# Patient Record
Sex: FEMALE
[De-identification: names, ages, dates, MRNs, and addresses within clinical notes are randomized per-mention and may not be internally consistent; named-entity substitution may affect disease eponyms.]

---

## 2021-10-23 ENCOUNTER — NURSE TRIAGE (OUTPATIENT)
Dept: OTHER | Facility: CLINIC | Age: 26
End: 2021-10-23

## 2021-10-23 NOTE — TELEPHONE ENCOUNTER
Brief description of triage: Chaya Anders states that she woke this morning with ear pain on the left. She is also positive for Covid. Please see triage below for more detailed information. Triage indicates for home care    Care advice provided, patient verbalizes understanding; denies any other questions or concerns; instructed to call back for any new or worsening symptoms. This triage is a result of a call to [address] [place] [address]. Please do not respond to the triage nurse through this encounter. Any subsequent communication should be directly with the patient. Reason for Disposition   Ear pain is main symptom   Earache  (Exceptions: brief ear pain of < 60 minutes duration, earache occurring during air travel    Answer Assessment - Initial Assessment Questions  1. COVID-19 DIAGNOSIS: \"Who made your Coronavirus (COVID-19) diagnosis? \" \"Was it confirmed by a positive lab test?\" If not diagnosed by a HCP, ask \"Are there lots of cases (community spread) where you live? \" (See [facility] health department website, if unsure)      Confirmed by a lab on Wednesday    2. COVID-19 EXPOSURE: \"Was there any known exposure to COVID before the symptoms began? \" CDC Definition of close contact: within 6 feet (2 meters) for a total of 15 minutes or more over a 24-hour period. Yes    3. ONSET: \"When did the COVID-19 symptoms start? \"       10/18/21 with sore throat    4. WORST SYMPTOM: \"What is your worst symptom? \" (e.g., cough, fever, shortness of breath, muscle aches)      Sore throat, nasal congestion    5. COUGH: \"Do you have a cough? \" If so, ask: \"How bad is the cough? \"        Slight cough but getting better    6. FEVER: \"Do you have a fever? \" If so, ask: \"What is your temperature, how was it measured, and when did it start? \"      100.5 and on day 3    7. RESPIRATORY STATUS: \"Describe your breathing? \" (e.g., shortness of breath, wheezing, unable to speak)       Denies all breathing problems    8. BETTER-SAME-WORSE: Brody Bowl you getting better, staying the same or getting worse compared to yesterday? \"  If getting worse, ask, \"In what way? \"      Cough is better but started with new ear pain. States that she overall feels better    9. HIGH RISK DISEASE: \"Do you have any chronic medical problems? \" (e.g., asthma, heart or lung disease, weak immune system, obesity, etc.)      Denies all    10. PREGNANCY: \"Is there any chance you are pregnant? \" \"When was your last menstrual period? \"        No    11. OTHER SYMPTOMS: \"Do you have any other symptoms? \"  (e.g., chills, fatigue, headache, loss of smell or taste, muscle pain, sore throat; new loss of smell or taste especially support the diagnosis of COVID-19)        Ear pain, 5/10, pressure and sharp when opening mouth    Answer Assessment - Initial Assessment Questions  1. LOCATION: \"Which ear is involved? \"        Left ear    2. SENSATION: \"Describe how the ear feels. \"       Congested with pain    3. ONSET:  \"When did the ear symptoms start? \"        Last night    4. PAIN: \"Do you also have an earache? \" If so, ask: \"How bad is it? \" (Scale 1-10; or mild, moderate, severe)      5/10    5. CAUSE: \"What do you think is causing the ear congestion? \"      Covid virus    6. URI: \"Do you have a runny nose or cough? \"       Congestion and cough    7. NASAL ALLERGIES: \"Are there symptoms of hay fever, such as sneezing or a clear nasal discharge? \"      NA    8. PREGNANCY: \"Is there any chance you are pregnant? \" \"When was your last menstrual period? \"      No    Protocols used: EAR - CONGESTION-ADULT-AH, EARACHE-ADULT-AH, CORONAVIRUS (COVID-19) DIAGNOSED OR SUSPECTED-ADULT-AH

## 2023-03-08 ENCOUNTER — TELEMEDICINE (OUTPATIENT)
Dept: PRIMARY CARE | Facility: CLINIC | Age: 28
End: 2023-03-08
Payer: COMMERCIAL

## 2023-03-08 PROBLEM — D68.51 HETEROZYGOUS FACTOR V LEIDEN MUTATION (MULTI): Status: ACTIVE | Noted: 2023-03-08

## 2023-03-08 PROBLEM — R50.9 FEVER AND CHILLS: Status: ACTIVE | Noted: 2023-03-08

## 2023-03-08 PROBLEM — Z20.822 EXPOSURE TO COVID-19 VIRUS: Status: ACTIVE | Noted: 2023-03-08

## 2023-03-08 PROBLEM — B27.00 GAMMAHERPESVIRAL MONONUCLEOSIS WITHOUT COMPLICATION: Status: ACTIVE | Noted: 2023-03-08

## 2023-03-08 PROBLEM — J02.9 SORE THROAT: Status: ACTIVE | Noted: 2023-03-08

## 2023-03-08 PROBLEM — U07.1 COVID-19: Status: ACTIVE | Noted: 2023-03-08

## 2023-03-08 PROBLEM — R09.89 RUNNY NOSE: Status: ACTIVE | Noted: 2023-03-08

## 2023-03-08 PROBLEM — R09.81 NASAL CONGESTION: Status: ACTIVE | Noted: 2023-03-08

## 2023-03-08 PROBLEM — G43.019 COMMON MIGRAINE WITH INTRACTABLE MIGRAINE: Status: ACTIVE | Noted: 2023-03-08

## 2023-03-08 PROBLEM — L30.9 DERMATITIS: Status: ACTIVE | Noted: 2023-03-08

## 2023-03-08 PROBLEM — H10.9 BACTERIAL CONJUNCTIVITIS OF LEFT EYE: Status: ACTIVE | Noted: 2023-03-08

## 2023-03-08 PROBLEM — R52 BODY ACHES: Status: ACTIVE | Noted: 2023-03-08

## 2023-03-08 PROBLEM — R05.9 COUGH: Status: ACTIVE | Noted: 2023-03-08

## 2023-03-08 RX ORDER — BUDESONIDE AND FORMOTEROL FUMARATE DIHYDRATE 160; 4.5 UG/1; UG/1
2 AEROSOL RESPIRATORY (INHALATION) 2 TIMES DAILY
COMMUNITY
Start: 2021-10-25

## 2023-03-08 RX ORDER — NORETHINDRONE 0.35 MG/1
TABLET ORAL
COMMUNITY
Start: 2018-11-06

## 2023-03-08 RX ORDER — TOBRAMYCIN 3 MG/ML
1 SOLUTION/ DROPS OPHTHALMIC 4 TIMES DAILY
COMMUNITY
Start: 2021-10-25 | End: 2023-08-10 | Stop reason: ALTCHOICE

## 2023-03-09 ENCOUNTER — TELEMEDICINE (OUTPATIENT)
Dept: PRIMARY CARE | Facility: CLINIC | Age: 28
End: 2023-03-09
Payer: COMMERCIAL

## 2023-03-09 DIAGNOSIS — G43.509 PERSISTENT MIGRAINE AURA WITHOUT CEREBRAL INFARCTION AND WITHOUT STATUS MIGRAINOSUS, NOT INTRACTABLE: Primary | ICD-10-CM

## 2023-03-09 PROCEDURE — 99213 OFFICE O/P EST LOW 20 MIN: CPT | Performed by: FAMILY MEDICINE

## 2023-03-09 RX ORDER — ZOLMITRIPTAN 2.5 MG/1
TABLET, ORALLY DISINTEGRATING ORAL
Qty: 6 TABLET | Refills: 5 | Status: SHIPPED | OUTPATIENT
Start: 2023-03-09 | End: 2023-08-10

## 2023-03-09 NOTE — PROGRESS NOTES
Subjective   Patient ID: Camila Altamirano is a 28 y.o. female who presents virtually for concerns of migraines     HPI     Camila presents virtually for concerns of migraines that are increasing in frequency that she would like to start an abortive medication if possible and is interesting in learning more about Botox for migraines, apparently some friends have used this with success  She has 2 in one month, and apparently frequency has increased over the last several months.  Headache symptoms seem to last about 5 hours and she has to go into a dark room until it passes.  Sometimes she will use OTC medications without much benefit.  She sees stars that she recognizes as her aura.  In the past, her arm used to go numb when she was a kid before she had a migraine had her, now it seems that this has been recurring.    Review of Systems   All other systems reviewed and are negative.    Objective   There were no vitals taken for this visit.    Physical Exam  CONSTITUTIONAL - well nourished, well developed, looks like stated age, in no acute distress, not ill-appearing, and not tired appearing  SKIN - normal skin color and pigmentation, normal skin turgor without rash, lesions, or nodules visualized  HEAD - no trauma, normocephalic  EYES - pupils are equal and reactive to light, extraocular muscles are intact, and normal external exam  ENT - moist mucus membranes   PSYCHIATRIC - alert, pleasant and cordial, age-appropriate      Assessment/Plan   Diagnoses and all orders for this visit:  Persistent migraine aura without cerebral infarction and without status migrainosus, not intractable  -     ZOLMitriptan (Zomig-ZMT) 2.5 mg disintegrating tablet; Take 1 tablet at the onset of headache, may repeat in 2 hours if unresolved. Do not exceed 10 mg in 24 hours.  -Contact office in a month regarding efficacy and tolerability after which we can make adjustments in medication, which may be to consider one of the newer migraine  agents  -Botox injections may have to come down the road, but I suspect that if we can control these migraine headaches with the use of any of the available oral medications, Botox should be an after thought  -If headache symptoms continue to persist, intracranial imaging may be warranted  -Risks, benefits, and options of treatment(s) were discussed after reviewing all current medication(s) and drug allergy(ies)    I evaluated the patient, participating in the key portions of the service.  I reviewed the resident’s note.  I agree with the resident’s findings and plan.     Freddy Neumann, DO

## 2023-03-15 ENCOUNTER — APPOINTMENT (OUTPATIENT)
Dept: PRIMARY CARE | Facility: CLINIC | Age: 28
End: 2023-03-15
Payer: COMMERCIAL

## 2023-04-13 ENCOUNTER — LAB (OUTPATIENT)
Dept: LAB | Facility: LAB | Age: 28
End: 2023-04-13
Payer: COMMERCIAL

## 2023-04-13 ENCOUNTER — OFFICE VISIT (OUTPATIENT)
Dept: PRIMARY CARE | Facility: CLINIC | Age: 28
End: 2023-04-13
Payer: COMMERCIAL

## 2023-04-13 VITALS
HEIGHT: 63 IN | DIASTOLIC BLOOD PRESSURE: 70 MMHG | HEART RATE: 71 BPM | WEIGHT: 139.2 LBS | SYSTOLIC BLOOD PRESSURE: 106 MMHG | BODY MASS INDEX: 24.66 KG/M2

## 2023-04-13 DIAGNOSIS — Z23 NEED FOR TDAP VACCINATION: ICD-10-CM

## 2023-04-13 DIAGNOSIS — Z00.00 ANNUAL PHYSICAL EXAM: ICD-10-CM

## 2023-04-13 DIAGNOSIS — G43.019 COMMON MIGRAINE WITH INTRACTABLE MIGRAINE: ICD-10-CM

## 2023-04-13 DIAGNOSIS — D22.9 BENIGN NEVUS: ICD-10-CM

## 2023-04-13 DIAGNOSIS — Z00.00 ANNUAL PHYSICAL EXAM: Primary | ICD-10-CM

## 2023-04-13 PROBLEM — H10.9 BACTERIAL CONJUNCTIVITIS OF LEFT EYE: Status: RESOLVED | Noted: 2023-03-08 | Resolved: 2023-04-13

## 2023-04-13 PROBLEM — L30.9 DERMATITIS: Status: RESOLVED | Noted: 2023-03-08 | Resolved: 2023-04-13

## 2023-04-13 PROBLEM — R09.89 RUNNY NOSE: Status: RESOLVED | Noted: 2023-03-08 | Resolved: 2023-04-13

## 2023-04-13 PROBLEM — J02.9 SORE THROAT: Status: RESOLVED | Noted: 2023-03-08 | Resolved: 2023-04-13

## 2023-04-13 PROBLEM — U07.1 COVID-19: Status: RESOLVED | Noted: 2023-03-08 | Resolved: 2023-04-13

## 2023-04-13 PROBLEM — R52 BODY ACHES: Status: RESOLVED | Noted: 2023-03-08 | Resolved: 2023-04-13

## 2023-04-13 PROBLEM — R05.9 COUGH: Status: RESOLVED | Noted: 2023-03-08 | Resolved: 2023-04-13

## 2023-04-13 PROBLEM — R50.9 FEVER AND CHILLS: Status: RESOLVED | Noted: 2023-03-08 | Resolved: 2023-04-13

## 2023-04-13 PROBLEM — R09.81 NASAL CONGESTION: Status: RESOLVED | Noted: 2023-03-08 | Resolved: 2023-04-13

## 2023-04-13 PROBLEM — Z20.822 EXPOSURE TO COVID-19 VIRUS: Status: RESOLVED | Noted: 2023-03-08 | Resolved: 2023-04-13

## 2023-04-13 LAB
ALANINE AMINOTRANSFERASE (SGPT) (U/L) IN SER/PLAS: 8 U/L (ref 7–45)
ALBUMIN (G/DL) IN SER/PLAS: 4.7 G/DL (ref 3.4–5)
ALKALINE PHOSPHATASE (U/L) IN SER/PLAS: 42 U/L (ref 33–110)
ANION GAP IN SER/PLAS: 13 MMOL/L (ref 10–20)
ASPARTATE AMINOTRANSFERASE (SGOT) (U/L) IN SER/PLAS: 20 U/L (ref 9–39)
BASOPHILS (10*3/UL) IN BLOOD BY AUTOMATED COUNT: 0.07 X10E9/L (ref 0–0.1)
BASOPHILS/100 LEUKOCYTES IN BLOOD BY AUTOMATED COUNT: 1 % (ref 0–2)
BILIRUBIN TOTAL (MG/DL) IN SER/PLAS: 1.2 MG/DL (ref 0–1.2)
CALCIUM (MG/DL) IN SER/PLAS: 9.3 MG/DL (ref 8.6–10.3)
CARBON DIOXIDE, TOTAL (MMOL/L) IN SER/PLAS: 24 MMOL/L (ref 21–32)
CHLORIDE (MMOL/L) IN SER/PLAS: 104 MMOL/L (ref 98–107)
CHOLESTEROL (MG/DL) IN SER/PLAS: 162 MG/DL (ref 0–199)
CHOLESTEROL IN HDL (MG/DL) IN SER/PLAS: 81.8 MG/DL
CHOLESTEROL/HDL RATIO: 2
CREATININE (MG/DL) IN SER/PLAS: 0.85 MG/DL (ref 0.5–1.05)
EOSINOPHILS (10*3/UL) IN BLOOD BY AUTOMATED COUNT: 0.07 X10E9/L (ref 0–0.7)
EOSINOPHILS/100 LEUKOCYTES IN BLOOD BY AUTOMATED COUNT: 1 % (ref 0–6)
ERYTHROCYTE DISTRIBUTION WIDTH (RATIO) BY AUTOMATED COUNT: 12.2 % (ref 11.5–14.5)
ERYTHROCYTE MEAN CORPUSCULAR HEMOGLOBIN CONCENTRATION (G/DL) BY AUTOMATED: 33.6 G/DL (ref 32–36)
ERYTHROCYTE MEAN CORPUSCULAR VOLUME (FL) BY AUTOMATED COUNT: 95 FL (ref 80–100)
ERYTHROCYTES (10*6/UL) IN BLOOD BY AUTOMATED COUNT: 4.63 X10E12/L (ref 4–5.2)
GFR FEMALE: >90 ML/MIN/1.73M2
GLUCOSE (MG/DL) IN SER/PLAS: 83 MG/DL (ref 74–99)
HEMATOCRIT (%) IN BLOOD BY AUTOMATED COUNT: 44 % (ref 36–46)
HEMOGLOBIN (G/DL) IN BLOOD: 14.8 G/DL (ref 12–16)
IMMATURE GRANULOCYTES/100 LEUKOCYTES IN BLOOD BY AUTOMATED COUNT: 0.3 % (ref 0–0.9)
LDL: 69 MG/DL (ref 0–99)
LEUKOCYTES (10*3/UL) IN BLOOD BY AUTOMATED COUNT: 7.3 X10E9/L (ref 4.4–11.3)
LYMPHOCYTES (10*3/UL) IN BLOOD BY AUTOMATED COUNT: 3.3 X10E9/L (ref 1.2–4.8)
LYMPHOCYTES/100 LEUKOCYTES IN BLOOD BY AUTOMATED COUNT: 45.4 % (ref 13–44)
MONOCYTES (10*3/UL) IN BLOOD BY AUTOMATED COUNT: 0.66 X10E9/L (ref 0.1–1)
MONOCYTES/100 LEUKOCYTES IN BLOOD BY AUTOMATED COUNT: 9.1 % (ref 2–10)
NEUTROPHILS (10*3/UL) IN BLOOD BY AUTOMATED COUNT: 3.15 X10E9/L (ref 1.2–7.7)
NEUTROPHILS/100 LEUKOCYTES IN BLOOD BY AUTOMATED COUNT: 43.2 % (ref 40–80)
PLATELETS (10*3/UL) IN BLOOD AUTOMATED COUNT: 265 X10E9/L (ref 150–450)
POC APPEARANCE, URINE: CLEAR
POC BILIRUBIN, URINE: NEGATIVE
POC BLOOD, URINE: NEGATIVE
POC COLOR, URINE: YELLOW
POC GLUCOSE, URINE: NEGATIVE MG/DL
POC KETONES, URINE: NEGATIVE MG/DL
POC LEUKOCYTES, URINE: NEGATIVE
POC NITRITE,URINE: NEGATIVE
POC PH, URINE: 6 PH
POC PROTEIN, URINE: NEGATIVE MG/DL
POC SPECIFIC GRAVITY, URINE: 1.01
POC UROBILINOGEN, URINE: 0.2 EU/DL
POTASSIUM (MMOL/L) IN SER/PLAS: 4.8 MMOL/L (ref 3.5–5.3)
PROTEIN TOTAL: 7.7 G/DL (ref 6.4–8.2)
SODIUM (MMOL/L) IN SER/PLAS: 136 MMOL/L (ref 136–145)
THYROTROPIN (MIU/L) IN SER/PLAS BY DETECTION LIMIT <= 0.05 MIU/L: 3.14 MIU/L (ref 0.44–3.98)
TRIGLYCERIDE (MG/DL) IN SER/PLAS: 57 MG/DL (ref 0–149)
UREA NITROGEN (MG/DL) IN SER/PLAS: 22 MG/DL (ref 6–23)
VLDL: 11 MG/DL (ref 0–40)

## 2023-04-13 PROCEDURE — 1036F TOBACCO NON-USER: CPT | Performed by: FAMILY MEDICINE

## 2023-04-13 PROCEDURE — 93000 ELECTROCARDIOGRAM COMPLETE: CPT | Performed by: FAMILY MEDICINE

## 2023-04-13 PROCEDURE — 99395 PREV VISIT EST AGE 18-39: CPT | Performed by: FAMILY MEDICINE

## 2023-04-13 PROCEDURE — 84443 ASSAY THYROID STIM HORMONE: CPT

## 2023-04-13 PROCEDURE — 90471 IMMUNIZATION ADMIN: CPT | Performed by: FAMILY MEDICINE

## 2023-04-13 PROCEDURE — 80061 LIPID PANEL: CPT

## 2023-04-13 PROCEDURE — 90715 TDAP VACCINE 7 YRS/> IM: CPT | Performed by: FAMILY MEDICINE

## 2023-04-13 PROCEDURE — 80053 COMPREHEN METABOLIC PANEL: CPT

## 2023-04-13 PROCEDURE — 85025 COMPLETE CBC W/AUTO DIFF WBC: CPT

## 2023-04-13 PROCEDURE — 36415 COLL VENOUS BLD VENIPUNCTURE: CPT

## 2023-04-13 PROCEDURE — 81002 URINALYSIS NONAUTO W/O SCOPE: CPT | Performed by: FAMILY MEDICINE

## 2023-04-13 NOTE — ASSESSMENT & PLAN NOTE
A complete history and physical examination performed  EKG reveals normal sinus rhythm without any acute changes  We will notify you of the results once available and make treatment recommendations accordingly

## 2023-04-13 NOTE — PROGRESS NOTES
"Subjective   Patient ID: Camila Altamirano is a 28 y.o. female who presents for Annual Exam.    HPI  1.  Physical exam.  Tdap 2010, willing to update today  Covid Pfizer x2 with interest in a booster   Up to date on all other vaccinations.  No family history of colon cancer   OB-GYN: Corin Kelley; last pap in 2021  Exercises 3-4 times a week; Diet is well balanced  Alcohol rarely, socially; denies tobacco use     2.  Migraine headaches.  Seen at the end of March, started on Zomig for abortive therapy  Denies any migraines in the last 2 months  Her Migraines have an aura of rainbow vision and left arm numbness  Happy with the current regiment for treatment    3.  Mole.  Last week noticed lesion on left upper chest, not seen in the past, denies any changes in size or texture    Review of Systems  All pertinent positive symptoms are included in the history of present illness.    All other systems have been reviewed and are negative and noncontributory to this patient's current ailments.    No Known Allergies    Immunization History   Administered Date(s) Administered    Hep A, Adult 05/04/2015    Hep B, adult 06/27/2013, 08/02/2013, 01/02/2014    Influenza, injectable, MDCK, preservative free, quadrivalent 09/16/2022    Pfizer Purple Cap SARS-CoV-2 02/12/2021, 03/05/2021    Rabies, Unspecified 10/18/2011, 10/21/2011, 10/28/2011    Tdap 07/09/2010, 04/13/2023    Varicella 11/15/2011       Objective   Vitals:    04/13/23 0811   BP: 106/70   BP Location: Left arm   Patient Position: Sitting   BP Cuff Size: Adult   Pulse: 71   Weight: 63.1 kg (139 lb 3.2 oz)   Height: 1.6 m (5' 3\")     Body mass index is 24.66 kg/m².    Physical Exam  CONSTITUTIONAL - well nourished, well developed, looks like stated age, in no acute distress, not ill-appearing, and not tired appearing  SKIN - normal skin color and pigmentation, normal skin turgor, 3 mm light brown lesion, circular, barely raised, located on the left upper chest " area  HEAD - no trauma, normocephalic  EYES - pupils are equal and reactive to light, extraocular muscles are intact, and normal external exam  NECK - supple without rigidity, no neck mass was observed, no thyromegaly or thyroid nodules  CHEST - clear to auscultation, no wheezing, no crackles and no rales, good effort  CARDIAC - regular rate and regular rhythm, no skipped beats, no murmur  ABDOMEN - no organomegaly, soft, nontender, nondistended, bowel sounds hyperactive  EXTREMITIES - no edema, no deformities  NEUROLOGICAL - normal gait, normal balance, normal motor, no ataxia; alert, oriented and no focal signs  PSYCHIATRIC - alert, pleasant and cordial, age-appropriate  IMMUNOLOGIC - no cervical lymphadenopathy     Assessment/Plan   Problem List Items Addressed This Visit       Common migraine with intractable migraine     Continue Zomig as needed for migraines.         Annual physical exam - Primary     A complete history and physical examination performed  EKG reveals normal sinus rhythm without any acute changes  We will notify you of the results once available and make treatment recommendations accordingly         Relevant Orders    POCT UA (nonautomated) manually resulted (Completed)    ECG 12 lead (Completed)    TSH with reflex to Free T4 if abnormal    Lipid Panel    Comprehensive Metabolic Panel    CBC and Auto Differential    Benign nevus    Need for Tdap vaccination    Relevant Orders    Tdap vaccine, age 10 years and older  (BOOSTRIX) (Completed)

## 2023-04-14 NOTE — RESULT ENCOUNTER NOTE
Congratulations, all of your blood work from your physical exam was completely normal.  Keep up the great work!    If you are currently taking medication, no changes to that medication is being recommended.

## 2023-06-06 ENCOUNTER — TELEPHONE (OUTPATIENT)
Dept: PRIMARY CARE | Facility: CLINIC | Age: 28
End: 2023-06-06
Payer: COMMERCIAL

## 2023-06-06 NOTE — TELEPHONE ENCOUNTER
Pt is traveling to [place] and was wondering since her dx of factor 5 if she needed to be rx anything since its a long flight

## 2023-08-10 ENCOUNTER — OFFICE VISIT (OUTPATIENT)
Dept: PRIMARY CARE | Facility: CLINIC | Age: 28
End: 2023-08-10
Payer: COMMERCIAL

## 2023-08-10 VITALS
SYSTOLIC BLOOD PRESSURE: 120 MMHG | WEIGHT: 142 LBS | DIASTOLIC BLOOD PRESSURE: 72 MMHG | BODY MASS INDEX: 25.15 KG/M2 | HEART RATE: 70 BPM

## 2023-08-10 DIAGNOSIS — D68.51 HETEROZYGOUS FACTOR V LEIDEN MUTATION (MULTI): ICD-10-CM

## 2023-08-10 DIAGNOSIS — G43.019 COMMON MIGRAINE WITH INTRACTABLE MIGRAINE: Primary | ICD-10-CM

## 2023-08-10 PROBLEM — G43.109 MIGRAINE WITH AURA AND WITHOUT STATUS MIGRAINOSUS, NOT INTRACTABLE: Status: ACTIVE | Noted: 2021-09-24

## 2023-08-10 PROCEDURE — 99214 OFFICE O/P EST MOD 30 MIN: CPT | Performed by: FAMILY MEDICINE

## 2023-08-10 PROCEDURE — 1036F TOBACCO NON-USER: CPT | Performed by: FAMILY MEDICINE

## 2023-08-10 ASSESSMENT — PATIENT HEALTH QUESTIONNAIRE - PHQ9
SUM OF ALL RESPONSES TO PHQ9 QUESTIONS 1 AND 2: 0
1. LITTLE INTEREST OR PLEASURE IN DOING THINGS: NOT AT ALL
2. FEELING DOWN, DEPRESSED OR HOPELESS: NOT AT ALL

## 2023-08-10 NOTE — PROGRESS NOTES
Subjective   Patient ID: Camila Altamirano is a 28 y.o. female who presents for Migraine.    HPI  Patient has a long-standing history of migraines with auras.   Reports getting approximately two typical migraines a month, and when these occur she is usually out of commission for about 2 days  Endorses a dull headache that occurs daily for which she wants to use Advil, but she tries to avoid it if possible because she is concerned about using it too frequently    She is worried about having a migraine during her work-hours as a    Recently prescribed zolmitriptan 2.5 mg earlier this year in the summer which has not improved her symptoms    Interested in an alternative mostly prophylactic therapy  Had Botox therapy in the past while living in [place] that eradicated her migraines for 3 months before they returned, and she felt them returning when it occurred  Interested in pursuing Botox therapy again    Review of Systems  All pertinent positive symptoms are included in the history of present illness.    All other systems have been reviewed and are negative and noncontributory to this patient's current ailments.    Current Outpatient Medications   Medication Instructions    budesonide-formoteroL (Symbicort) 160-4.5 mcg/actuation inhaler 2 puffs, inhalation, 2 times daily, RINSE MOUTH AFTER USE.    norethindrone (Ninfa) 0.35 mg tablet oral    rimegepant (NURTEC ODT) 75 mg, oral, Every other day     No Known Allergies    Immunization History   Administered Date(s) Administered    Hepatitis A vaccine, age 19 years and greater (HAVRIX) 05/04/2015    Hepatitis B vaccine, adult (RECOMBIVAX, ENGERIX) 06/27/2013, 08/02/2013, 01/02/2014    Influenza, injectable, MDCK, preservative free, quadrivalent 09/16/2022    Pfizer Purple Cap SARS-CoV-2 02/12/2021, 03/05/2021    Rabies, Unspecified 10/18/2011, 10/21/2011, 10/28/2011    Tdap vaccine, age 10 years and older (BOOSTRIX) 07/09/2010, 04/13/2023    Varicella  vaccine, subcutaneous (VARIVAX) 11/15/2011     Past Surgical History:   Procedure Laterality Date    OTHER SURGICAL HISTORY  10/11/2013    Prior Surgical Procedure Not Done     Family History   Problem Relation Name Age of Onset    Breast cancer Mother      Thyroid cancer Mother      Thyroid cancer Brother       Social History     Tobacco Use    Smoking status: Never     Passive exposure: Never    Smokeless tobacco: Never   Substance Use Topics    Alcohol use: Never    Drug use: Never       Objective   Visit Vitals  /72   Pulse 70   Wt 64.4 kg (142 lb)   BMI 25.15 kg/m²   Smoking Status Never   BSA 1.69 m²       Physical Exam  CONSTITUTIONAL - well nourished, well developed, looks like stated age, in no acute distress, not ill-appearing, and not tired appearing  SKIN - normal skin color and pigmentation, normal skin turgor without rash, lesions, or nodules visualized  CHEST - clear to auscultation, no wheezing, no crackles and no rales, good effort  CARDIAC - regular rate and regular rhythm, no skipped beats, no murmur  EXTREMITIES - no edema, no deformities  NEUROLOGICAL - alert and oriented x 3  PSYCHIATRIC - alert, pleasant and cordial, age-appropriate  IMMUNOLOGIC - no cervical lymphadenopathy     Assessment/Plan   Problem List Items Addressed This Visit       Common migraine with intractable migraine - Primary     Neurology consultation to perform Botox for migraine headaches  Please contact  line to set up this appointment    In the interim, I am going to provide you with samples of Nurtec 75 mg ODT to be used every other day as a preventative medication  If this is beneficial, notify the office regarding efficacy and tolerability after which I can send that prescription to your local pharmacy         Relevant Medications    rimegepant (Nurtec ODT) 75 mg tablet,disintegrating    Other Relevant Orders    Referral to Neurology    Heterozygous factor V Leiden mutation (CMS/HCC)     Stable,  asymptomatic

## 2023-08-11 NOTE — ASSESSMENT & PLAN NOTE
Neurology consultation to perform Botox for migraine headaches  Please contact  line to set up this appointment    In the interim, I am going to provide you with samples of Nurtec 75 mg ODT to be used every other day as a preventative medication  If this is beneficial, notify the office regarding efficacy and tolerability after which I can send that prescription to your local pharmacy

## 2023-11-17 ENCOUNTER — PHARMACY VISIT (OUTPATIENT)
Dept: PHARMACY | Facility: CLINIC | Age: 28
End: 2023-11-17
Payer: COMMERCIAL

## 2023-11-17 ENCOUNTER — OFFICE VISIT (OUTPATIENT)
Dept: NEUROLOGY | Facility: CLINIC | Age: 28
End: 2023-11-17
Payer: COMMERCIAL

## 2023-11-17 VITALS
HEART RATE: 75 BPM | BODY MASS INDEX: 24.45 KG/M2 | WEIGHT: 138 LBS | SYSTOLIC BLOOD PRESSURE: 130 MMHG | DIASTOLIC BLOOD PRESSURE: 75 MMHG

## 2023-11-17 DIAGNOSIS — G43.709 CHRONIC MIGRAINE W/O AURA W/O STATUS MIGRAINOSUS, NOT INTRACTABLE: Primary | ICD-10-CM

## 2023-11-17 PROCEDURE — 99214 OFFICE O/P EST MOD 30 MIN: CPT | Performed by: STUDENT IN AN ORGANIZED HEALTH CARE EDUCATION/TRAINING PROGRAM

## 2023-11-17 PROCEDURE — 1036F TOBACCO NON-USER: CPT | Performed by: STUDENT IN AN ORGANIZED HEALTH CARE EDUCATION/TRAINING PROGRAM

## 2023-11-17 PROCEDURE — RXMED WILLOW AMBULATORY MEDICATION CHARGE

## 2023-11-17 RX ORDER — AMITRIPTYLINE HYDROCHLORIDE 10 MG/1
10 TABLET, FILM COATED ORAL NIGHTLY
COMMUNITY
Start: 2023-09-12 | End: 2024-02-22 | Stop reason: WASHOUT

## 2023-11-17 RX ORDER — PROPRANOLOL HYDROCHLORIDE 20 MG/1
TABLET ORAL
Qty: 60 TABLET | Refills: 3 | Status: SHIPPED | OUTPATIENT
Start: 2023-11-17 | End: 2024-02-22 | Stop reason: WASHOUT

## 2023-11-17 ASSESSMENT — ANXIETY QUESTIONNAIRES
3. WORRYING TOO MUCH ABOUT DIFFERENT THINGS: NOT AT ALL
1. FEELING NERVOUS, ANXIOUS, OR ON EDGE: NOT AT ALL
IF YOU CHECKED OFF ANY PROBLEMS ON THIS QUESTIONNAIRE, HOW DIFFICULT HAVE THESE PROBLEMS MADE IT FOR YOU TO DO YOUR WORK, TAKE CARE OF THINGS AT HOME, OR GET ALONG WITH OTHER PEOPLE: NOT DIFFICULT AT ALL
4. TROUBLE RELAXING: NOT AT ALL
2. NOT BEING ABLE TO STOP OR CONTROL WORRYING: NOT AT ALL
6. BECOMING EASILY ANNOYED OR IRRITABLE: NOT AT ALL
5. BEING SO RESTLESS THAT IT IS HARD TO SIT STILL: NOT AT ALL
GAD7 TOTAL SCORE: 0
7. FEELING AFRAID AS IF SOMETHING AWFUL MIGHT HAPPEN: NOT AT ALL

## 2023-11-17 ASSESSMENT — ENCOUNTER SYMPTOMS
OCCASIONAL FEELINGS OF UNSTEADINESS: 0
DEPRESSION: 0
LOSS OF SENSATION IN FEET: 0

## 2023-11-17 NOTE — PATIENT INSTRUCTIONS
You have been prescribed Propranolol 20mg tablets. With the goal of increasing to 40mg per day.    Please take 1 tablet (20mg) in the evening for 1 week  If no side effect or intolerance, then increase to 2 tablets (40mg) in the evening til next seen    It can take upwards of 3 months to see the effect of propranolol on your headaches.    Most common side effects include light-headedness with standing, slowed heart rate, fatigue, and loss of libido. More severe side effects include difficulty breathing.   If you develop side effects that are not tolerable, please contact my office for medication discontinuation plan.

## 2023-11-17 NOTE — PROGRESS NOTES
Subjective   Camila Altamirano is a 28 y.o.   female who is being followed for chronic migraine without aura.     Since last seen, patient states that amitriptyline was causing sluggishness and grogginess, while not providing any relief. Still having 16/30 HA days per month. Nurtec has been effective but still waiting on refills.     Current Outpatient Medications   Medication Instructions    amitriptyline (ELAVIL) 10 mg, oral, Nightly    budesonide-formoteroL (Symbicort) 160-4.5 mcg/actuation inhaler 2 puffs, inhalation, 2 times daily, RINSE MOUTH AFTER USE.    norethindrone (Ninfa) 0.35 mg tablet oral    rimegepant (NURTEC) 75 mg tablet,disintegrating TAKE 1 TABLET EVERY DAY AS NEEDED FOR HEADACHES       Assessment/Plan   Patient with chronic migraine without aura. Amitriptyline was not effective in controlling headaches and was causing side effect of sluggishness/grogginess. Self discontinued due to adverse effects. Per our discussion, will start propanolol for migraine prevention. In regards to breakthrough headaches, will aim to continue Nurtec. Have reached out to pharmacy to get updates as to approval process.    - discontinue amitriptyline  - start propranolol 40mg nightly  - continue nurtec 75mg PRN  - follow up 3 months    I personally spent 32 minutes today, exclusive of procedures, providing care for this patient, including preparation, face to face time, documentation and other services such as review of medical records, diagnostic result, patient education, counseling, coordination of care as specified in the encounter.

## 2023-11-20 ENCOUNTER — SPECIALTY PHARMACY (OUTPATIENT)
Dept: PHARMACY | Facility: CLINIC | Age: 28
End: 2023-11-20

## 2023-12-19 ENCOUNTER — SPECIALTY PHARMACY (OUTPATIENT)
Dept: PHARMACY | Facility: CLINIC | Age: 28
End: 2023-12-19

## 2024-01-16 ENCOUNTER — PHARMACY VISIT (OUTPATIENT)
Dept: PHARMACY | Facility: CLINIC | Age: 29
End: 2024-01-16
Payer: COMMERCIAL

## 2024-01-16 PROCEDURE — RXMED WILLOW AMBULATORY MEDICATION CHARGE

## 2024-02-09 ENCOUNTER — SPECIALTY PHARMACY (OUTPATIENT)
Dept: PHARMACY | Facility: CLINIC | Age: 29
End: 2024-02-09

## 2024-02-22 ENCOUNTER — OFFICE VISIT (OUTPATIENT)
Dept: NEUROLOGY | Facility: CLINIC | Age: 29
End: 2024-02-22
Payer: COMMERCIAL

## 2024-02-22 DIAGNOSIS — G43.709 CHRONIC MIGRAINE W/O AURA W/O STATUS MIGRAINOSUS, NOT INTRACTABLE: Primary | ICD-10-CM

## 2024-02-22 PROCEDURE — 99214 OFFICE O/P EST MOD 30 MIN: CPT | Performed by: STUDENT IN AN ORGANIZED HEALTH CARE EDUCATION/TRAINING PROGRAM

## 2024-02-22 PROCEDURE — 1036F TOBACCO NON-USER: CPT | Performed by: STUDENT IN AN ORGANIZED HEALTH CARE EDUCATION/TRAINING PROGRAM

## 2024-02-22 NOTE — PROGRESS NOTES
Subjective   Camila Altamirano is a 29 y.o.   female who is being followed for chronic migraine without aura.     Since last seen, patient states that propranolol was causing significant light-headedness and exercise fatigability. Self discontinued a few weeks ago. Nurtec tolerated well, but only relieving headaches.     Current Outpatient Medications   Medication Instructions    amitriptyline (ELAVIL) 10 mg, oral, Nightly    budesonide-formoteroL (Symbicort) 160-4.5 mcg/actuation inhaler 2 puffs, inhalation, 2 times daily, RINSE MOUTH AFTER USE.    norethindrone (Ninfa) 0.35 mg tablet oral    propranolol (Inderal) 20 mg tablet Take 20mg at night for 1 week, then increase to 40mg at night    rimegepant (NURTEC) 75 mg tablet,disintegrating Take one (1) tablet by mouth every day as needed for headaches. Do not exceed 75mg (1 tablet) per 24 hours.       Assessment/Plan   Patient with chronic migraine without aura. Propranolol not tolerated and already discontinued. Amitriptyline trialed in the past and not tolerated. Per our discussion, will start Botox for migraine ppx. For breakthrough headaches, will continue Nurtec.    - start Botox PREEMPT  - continue nurtec 75mg PRN

## 2024-03-01 ENCOUNTER — APPOINTMENT (OUTPATIENT)
Dept: NEUROLOGY | Facility: CLINIC | Age: 29
End: 2024-03-01
Payer: COMMERCIAL

## 2024-03-11 ENCOUNTER — PROCEDURE VISIT (OUTPATIENT)
Dept: NEUROLOGY | Facility: CLINIC | Age: 29
End: 2024-03-11
Payer: COMMERCIAL

## 2024-03-11 DIAGNOSIS — G43.709 CHRONIC MIGRAINE WITHOUT AURA WITHOUT STATUS MIGRAINOSUS, NOT INTRACTABLE: Primary | ICD-10-CM

## 2024-03-11 PROCEDURE — 64615 CHEMODENERV MUSC MIGRAINE: CPT | Performed by: STUDENT IN AN ORGANIZED HEALTH CARE EDUCATION/TRAINING PROGRAM

## 2024-03-11 NOTE — PROGRESS NOTES
Neurology Botulinum Injection    Subjective   Camila Altamirano is an 29 y.o. female here for medical botulinum injection for Chronic migraine without aura without status migrainosus, not intractable [G43.709].     No change in headache frequency or severity since last seen. No change in migraine phenotype. Would like to proceed with Botox today.    Head/Face/Jaw Botulinum Injection    Date/Time: 3/11/2024 8:54 AM    Performed by: Andreas Lopez DO  Authorized by: Andreas Lopez DO      Procedure details:        Total units injected:  0     Total units wasted:  0    Comments: Procedure Note: PREEMPT Botox    Indications: Chronic Migraine    Informed consent was obtained (explaining the procedure and risks and benefits of procedure) from patient: the signed consent form was placed in the medical record.  A time out was completed, verifying correct patient, procedure,site, positioning, and implants or special equipment.    200 units of OnabotulinumtoxinA were reconstituted with saline. Sites of injection were identified via PREEMPT protocol and cleaned with alcohol pads. Using a 30 gauge needle, patient received following injections:    5 units in procerus  5 units in each left and right   10 units divided between 2 sites of L frontalis  10 units divided between 2 sites of R frontalis  20 units divided between 4 sites of L temporalis  20 units divided between 4 sites of R temporalis  15 units divided between 3 sites of L occipitalis  15 units divided between 3 sites of R occipitalis  10 units divided between 2 sites of L paracervical muscles  10 units divided between 2 sites of R paracervical muscles  15 units divided between 3 sites of L trapezius  15 units divided between 3 sites of R trapezius    Additional Sites:  none    Used: 155u  Wasted: 45u    There were no complications. Patient was comfortable and left without complaint.     OnabotulinumtoxinA  Lot #: [id number]  Exp: 4/2026

## 2024-03-15 ENCOUNTER — SPECIALTY PHARMACY (OUTPATIENT)
Dept: PHARMACY | Facility: CLINIC | Age: 29
End: 2024-03-15

## 2024-05-10 ENCOUNTER — SPECIALTY PHARMACY (OUTPATIENT)
Dept: PHARMACY | Facility: CLINIC | Age: 29
End: 2024-05-10

## 2024-05-10 PROCEDURE — RXMED WILLOW AMBULATORY MEDICATION CHARGE

## 2024-05-14 ENCOUNTER — PHARMACY VISIT (OUTPATIENT)
Dept: PHARMACY | Facility: CLINIC | Age: 29
End: 2024-05-14
Payer: COMMERCIAL

## 2024-06-10 ENCOUNTER — PROCEDURE VISIT (OUTPATIENT)
Dept: NEUROLOGY | Facility: CLINIC | Age: 29
End: 2024-06-10
Payer: COMMERCIAL

## 2024-06-10 DIAGNOSIS — G43.709 CHRONIC MIGRAINE WITHOUT AURA WITHOUT STATUS MIGRAINOSUS, NOT INTRACTABLE: Primary | ICD-10-CM

## 2024-06-10 PROCEDURE — 64615 CHEMODENERV MUSC MIGRAINE: CPT | Performed by: STUDENT IN AN ORGANIZED HEALTH CARE EDUCATION/TRAINING PROGRAM

## 2024-06-10 RX ORDER — IBUPROFEN 800 MG/1
TABLET ORAL
COMMUNITY
Start: 2024-05-28

## 2024-06-10 RX ORDER — DROSPIRENONE 4 MG/1
1 TABLET, FILM COATED ORAL
COMMUNITY
Start: 2024-04-24 | End: 2025-06-18

## 2024-06-10 RX ORDER — AMOXICILLIN 500 MG/1
CAPSULE ORAL
COMMUNITY
Start: 2024-05-28 | End: 2024-06-11 | Stop reason: WASHOUT

## 2024-06-10 NOTE — PROGRESS NOTES
Neurology Botulinum Injection    Subjective   Camila Altamirano is an 29 y.o. female here for medical botulinum injection for Chronic migraine without aura without status migrainosus, not intractable [G43.709].     Since last Botox, headaches have reduced to 10/30 HA days per month, with a greater than 50% reduction in severity. Would like to proceed with Botox today. Denies any side effects to Botox.     Botox    Date/Time: 6/10/2024 8:49 AM    Performed by: Andreas Lopez DO  Authorized by: Andreas Lopez DO    Procedure specific details:      Procedure Note: PREEMPT Botox    Indications: Chronic Migraine    Informed consent was obtained (explaining the procedure and risks and benefits of procedure) from patient: the signed consent form was placed in the medical record.  A time out was completed, verifying correct patient, procedure,site, positioning, and implants or special equipment.    200 units of OnabotulinumtoxinA were reconstituted with saline. Sites of injection were identified via PREEMPT protocol and cleaned with alcohol pads. Using a 30 gauge needle, patient received following injections:    5 units in procerus  5 units in each left and right   10 units divided between 2 sites of L frontalis  10 units divided between 2 sites of R frontalis  20 units divided between 4 sites of L temporalis  20 units divided between 4 sites of R temporalis  15 units divided between 3 sites of L occipitalis  15 units divided between 3 sites of R occipitalis  10 units divided between 2 sites of L paracervical muscles  10 units divided between 2 sites of R paracervical muscles  15 units divided between 3 sites of L trapezius  15 units divided between 3 sites of R trapezius    Additional Sites:  none    Used: 155u  Wasted: 45u    There were no complications. Patient was comfortable and left without complaint.     OnabotulinumtoxinA  Lot #: [id number]  Exp: 6/2026

## 2024-06-11 ENCOUNTER — LAB (OUTPATIENT)
Dept: LAB | Facility: LAB | Age: 29
End: 2024-06-11
Payer: COMMERCIAL

## 2024-06-11 ENCOUNTER — OFFICE VISIT (OUTPATIENT)
Dept: PRIMARY CARE | Facility: CLINIC | Age: 29
End: 2024-06-11
Payer: COMMERCIAL

## 2024-06-11 ENCOUNTER — SPECIALTY PHARMACY (OUTPATIENT)
Dept: PHARMACY | Facility: CLINIC | Age: 29
End: 2024-06-11

## 2024-06-11 VITALS
HEIGHT: 63 IN | BODY MASS INDEX: 24.45 KG/M2 | DIASTOLIC BLOOD PRESSURE: 80 MMHG | HEART RATE: 64 BPM | SYSTOLIC BLOOD PRESSURE: 118 MMHG | WEIGHT: 138 LBS

## 2024-06-11 DIAGNOSIS — Z11.1 TUBERCULOSIS SCREENING: ICD-10-CM

## 2024-06-11 DIAGNOSIS — Z11.4 ENCOUNTER FOR SCREENING FOR HIV: ICD-10-CM

## 2024-06-11 DIAGNOSIS — Z11.59 NEED FOR HEPATITIS C SCREENING TEST: ICD-10-CM

## 2024-06-11 DIAGNOSIS — Z00.00 ANNUAL PHYSICAL EXAM: ICD-10-CM

## 2024-06-11 DIAGNOSIS — Z00.00 ANNUAL PHYSICAL EXAM: Primary | ICD-10-CM

## 2024-06-11 DIAGNOSIS — G43.109 MIGRAINE WITH AURA AND WITHOUT STATUS MIGRAINOSUS, NOT INTRACTABLE: ICD-10-CM

## 2024-06-11 PROBLEM — G43.019 COMMON MIGRAINE WITH INTRACTABLE MIGRAINE: Status: RESOLVED | Noted: 2023-03-08 | Resolved: 2024-06-11

## 2024-06-11 PROBLEM — B27.00 GAMMAHERPESVIRAL MONONUCLEOSIS WITHOUT COMPLICATION: Status: RESOLVED | Noted: 2023-03-08 | Resolved: 2024-06-11

## 2024-06-11 PROBLEM — Z23 NEED FOR TDAP VACCINATION: Status: RESOLVED | Noted: 2023-04-13 | Resolved: 2024-06-11

## 2024-06-11 LAB
ALBUMIN SERPL BCP-MCNC: 4.6 G/DL (ref 3.4–5)
ALP SERPL-CCNC: 40 U/L (ref 33–110)
ALT SERPL W P-5'-P-CCNC: 11 U/L (ref 7–45)
ANION GAP SERPL CALC-SCNC: 11 MMOL/L (ref 10–20)
AST SERPL W P-5'-P-CCNC: 14 U/L (ref 9–39)
BASOPHILS # BLD AUTO: 0.06 X10*3/UL (ref 0–0.1)
BASOPHILS NFR BLD AUTO: 0.6 %
BILIRUB SERPL-MCNC: 0.8 MG/DL (ref 0–1.2)
BUN SERPL-MCNC: 18 MG/DL (ref 6–23)
CALCIUM SERPL-MCNC: 9.2 MG/DL (ref 8.6–10.3)
CHLORIDE SERPL-SCNC: 106 MMOL/L (ref 98–107)
CHOLEST SERPL-MCNC: 154 MG/DL (ref 0–199)
CHOLESTEROL/HDL RATIO: 2
CO2 SERPL-SCNC: 25 MMOL/L (ref 21–32)
CREAT SERPL-MCNC: 0.8 MG/DL (ref 0.5–1.05)
EGFRCR SERPLBLD CKD-EPI 2021: >90 ML/MIN/1.73M*2
EOSINOPHIL # BLD AUTO: 0.06 X10*3/UL (ref 0–0.7)
EOSINOPHIL NFR BLD AUTO: 0.6 %
ERYTHROCYTE [DISTWIDTH] IN BLOOD BY AUTOMATED COUNT: 12.5 % (ref 11.5–14.5)
GLUCOSE SERPL-MCNC: 86 MG/DL (ref 74–99)
HCT VFR BLD AUTO: 43.1 % (ref 36–46)
HCV AB SER QL: NONREACTIVE
HDLC SERPL-MCNC: 76.8 MG/DL
HGB BLD-MCNC: 14.3 G/DL (ref 12–16)
HIV 1+2 AB+HIV1 P24 AG SERPL QL IA: NONREACTIVE
IMM GRANULOCYTES # BLD AUTO: 0.03 X10*3/UL (ref 0–0.7)
IMM GRANULOCYTES NFR BLD AUTO: 0.3 % (ref 0–0.9)
LDLC SERPL CALC-MCNC: 67 MG/DL
LYMPHOCYTES # BLD AUTO: 3.32 X10*3/UL (ref 1.2–4.8)
LYMPHOCYTES NFR BLD AUTO: 34.6 %
MCH RBC QN AUTO: 32.1 PG (ref 26–34)
MCHC RBC AUTO-ENTMCNC: 33.2 G/DL (ref 32–36)
MCV RBC AUTO: 97 FL (ref 80–100)
MONOCYTES # BLD AUTO: 0.65 X10*3/UL (ref 0.1–1)
MONOCYTES NFR BLD AUTO: 6.8 %
NEUTROPHILS # BLD AUTO: 5.47 X10*3/UL (ref 1.2–7.7)
NEUTROPHILS NFR BLD AUTO: 57.1 %
NON HDL CHOLESTEROL: 77 MG/DL (ref 0–149)
NRBC BLD-RTO: 0 /100 WBCS (ref 0–0)
PLATELET # BLD AUTO: 263 X10*3/UL (ref 150–450)
POC APPEARANCE, URINE: CLEAR
POC BILIRUBIN, URINE: NEGATIVE
POC BLOOD, URINE: NEGATIVE
POC COLOR, URINE: YELLOW
POC GLUCOSE, URINE: NEGATIVE MG/DL
POC KETONES, URINE: NEGATIVE MG/DL
POC LEUKOCYTES, URINE: NEGATIVE
POC NITRITE,URINE: NEGATIVE
POC PH, URINE: 6 PH
POC PROTEIN, URINE: NEGATIVE MG/DL
POC SPECIFIC GRAVITY, URINE: 1.02
POC UROBILINOGEN, URINE: 0.2 EU/DL
POTASSIUM SERPL-SCNC: 4.7 MMOL/L (ref 3.5–5.3)
PROT SERPL-MCNC: 6.9 G/DL (ref 6.4–8.2)
RBC # BLD AUTO: 4.46 X10*6/UL (ref 4–5.2)
SODIUM SERPL-SCNC: 137 MMOL/L (ref 136–145)
TRIGL SERPL-MCNC: 51 MG/DL (ref 0–149)
TSH SERPL-ACNC: 2.51 MIU/L (ref 0.44–3.98)
VLDL: 10 MG/DL (ref 0–40)
WBC # BLD AUTO: 9.6 X10*3/UL (ref 4.4–11.3)

## 2024-06-11 PROCEDURE — 85025 COMPLETE CBC W/AUTO DIFF WBC: CPT

## 2024-06-11 PROCEDURE — 81002 URINALYSIS NONAUTO W/O SCOPE: CPT | Performed by: FAMILY MEDICINE

## 2024-06-11 PROCEDURE — 86481 TB AG RESPONSE T-CELL SUSP: CPT

## 2024-06-11 PROCEDURE — 80061 LIPID PANEL: CPT

## 2024-06-11 PROCEDURE — 86803 HEPATITIS C AB TEST: CPT

## 2024-06-11 PROCEDURE — 87389 HIV-1 AG W/HIV-1&-2 AB AG IA: CPT

## 2024-06-11 PROCEDURE — 84443 ASSAY THYROID STIM HORMONE: CPT

## 2024-06-11 PROCEDURE — 99395 PREV VISIT EST AGE 18-39: CPT | Performed by: FAMILY MEDICINE

## 2024-06-11 PROCEDURE — 1036F TOBACCO NON-USER: CPT | Performed by: FAMILY MEDICINE

## 2024-06-11 PROCEDURE — 93000 ELECTROCARDIOGRAM COMPLETE: CPT | Performed by: FAMILY MEDICINE

## 2024-06-11 PROCEDURE — 36415 COLL VENOUS BLD VENIPUNCTURE: CPT

## 2024-06-11 PROCEDURE — 80053 COMPREHEN METABOLIC PANEL: CPT

## 2024-06-11 ASSESSMENT — PATIENT HEALTH QUESTIONNAIRE - PHQ9
1. LITTLE INTEREST OR PLEASURE IN DOING THINGS: NOT AT ALL
2. FEELING DOWN, DEPRESSED OR HOPELESS: NOT AT ALL
SUM OF ALL RESPONSES TO PHQ9 QUESTIONS 1 AND 2: 0

## 2024-06-11 NOTE — PROGRESS NOTES
Subjective   Patient ID: Camila Altamirano is a 29 y.o. female who presents for Annual Exam.    Past Medical, Surgical, and Family History reviewed and updated in chart.    Reviewed all medications by prescribing practitioner or clinical pharmacist (such as prescriptions, OTCs, herbal therapies and supplements) and documented in the medical record.    HPI  1. Physical Exam:  Camila's immunizations are confirmed to be current and up-to-date. She has no family history of colon cancer, and her last pap test conducted in May 2024 was reported as normal. Camila shared that this may be her last visit to our clinic, as she is relocating to [place] in three days due to her fiancé's new employment with [employer] as a . Camila has plans to work for an [employer] company in [place]. 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    Camila is requesting to be screened for tuberculosis secondary to licensure in [place].    2. Migraine Headaches:  Camila is currently under the care of Dr. Lopez in Neurology due to persistent migraine headaches. Her last appointment was yesterday, during which she received Botox injections for her migraines. Dr. Lopez's note indicates a significant improvement in her condition - the frequency of her headaches has reduced to 10 days out of a month, and the severity has been reduced by more than 50%. This positive response is the reason she continues with this treatment.    Review of Systems  All pertinent positive symptoms are included in the history of present illness.    All other systems have been reviewed and are negative and noncontributory to this patient's current ailments.    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/24/2016    Heterozygous factor V Leiden mutation    Localized enlarged lymph nodes  08/21/2018    Cervical lymphadenopathy    Localized enlarged lymph nodes 08/21/2018    Occipital lymphadenopathy    Localized enlarged lymph nodes 08/21/2018    Supraclavicular lymphadenopathy    Migraine without aura, intractable, without status migrainosus 08/21/2018    Common migraine with intractable migraine    Rash and other nonspecific skin eruption 08/21/2018    Rash     Past Surgical History:   Procedure Laterality Date    OTHER SURGICAL HISTORY  10/11/2013    Prior Surgical Procedure Not Done     Social History     Tobacco Use    Smoking status: Never     Passive exposure: Never    Smokeless tobacco: Never   Substance Use Topics    Alcohol use: Never    Drug use: Never     Family History   Problem Relation Name Age of Onset    Breast cancer Mother      Thyroid cancer Mother      Thyroid cancer Brother       Immunization History   Administered Date(s) Administered    Flu vaccine, quadrivalent, no egg protein, age 6 month or greater (FLUCELVAX) 09/16/2022    Hepatitis A vaccine, age 19 years and greater (HAVRIX) 05/04/2015    Hepatitis B vaccine, adult (RECOMBIVAX, ENGERIX) 06/27/2013, 08/02/2013, 01/02/2014    Influenza, Unspecified 10/09/2020    Pfizer Purple Cap SARS-CoV-2 02/12/2021, 03/05/2021    Rabies, Unspecified 10/18/2011, 10/21/2011, 10/28/2011    Tdap vaccine, age 7 year and older (BOOSTRIX, ADACEL) 07/09/2010, 04/13/2023    Varicella vaccine, subcutaneous (VARIVAX) 11/15/2011     Current Outpatient Medications   Medication Instructions    budesonide-formoteroL (Symbicort) 160-4.5 mcg/actuation inhaler 2 puffs, inhalation, 2 times daily, RINSE MOUTH AFTER USE.    drospirenone, contraceptive, (Slynd) 4 mg (28) tablet 1 tablet, oral    ibuprofen 800 mg tablet TAKE 1 TABLET EVERY 6 HOURS FOR 72 HOURS THEN TAKE AS NEEDED    rimegepant (NURTEC) 75 mg tablet,disintegrating Take one (1) tablet by mouth every day as needed for headaches. Do not exceed 75mg (1 tablet) per 24 hours.     No Known  "Allergies    Objective   Vitals:    06/11/24 0820   BP: 118/80   Pulse: 64   Weight: 62.6 kg (138 lb)   Height: 1.6 m (5' 3\")     Body mass index is 24.45 kg/m².    BP Readings from Last 3 Encounters:   06/11/24 118/80   11/17/23 130/75   08/10/23 120/72      Wt Readings from Last 3 Encounters:   06/11/24 62.6 kg (138 lb)   11/17/23 62.6 kg (138 lb)   08/10/23 64.4 kg (142 lb)      Physical Exam  CONSTITUTIONAL - well nourished, well developed, looks like stated age, in no acute distress, not ill-appearing, and not tired appearing  SKIN - normal skin color and pigmentation, normal skin turgor without rash, lesions, or nodules visualized  HEAD - no trauma, normocephalic  EYES - pupils are equal and reactive to light, extraocular muscles are intact, and normal external exam  ENT - TM's intact, no injection, no signs of infection, uvula midline, normal tongue movement and throat normal, no exudate  NECK - supple without rigidity, no neck mass was observed, no thyromegaly or thyroid nodules  CHEST - clear to auscultation, no wheezing, no crackles and no rales, good effort  CARDIAC - regular rate and regular rhythm, no skipped beats, no murmur  ABDOMEN - no organomegaly, soft, nontender, nondistended, normal bowel sounds, no guarding/rebound/rigidity, negative McBurney sign and negative Ng sign  EXTREMITIES - no obvious or evident edema, no obvious or evident deformities  NEUROLOGICAL - normal gait, normal balance, normal motor, no ataxia, alert, oriented and no focal signs  PSYCHIATRIC - alert, pleasant and cordial, age-appropriate  IMMUNOLOGIC - no cervical lymphadenopathy    Assessment/Plan   Problem List Items Addressed This Visit       Annual physical exam - Primary     Complete history and physical examination was performed  EKG reveals sinus rhythm without acute changes  We will notify of test results once available    It was such a pleasure getting to know you since you have been a preteen, and I wish you the " best of luck with your transition and move to [place]!  If there is anything that I can ever do for you going forward, do not hesitate to reach out         Relevant Orders    Lipid Panel    TSH with reflex to Free T4 if abnormal    Comprehensive Metabolic Panel    CBC and Auto Differential    HIV 1/2 Antigen/Antibody Screen with Reflex to Confirmation    Hepatitis C Antibody    POCT UA (nonautomated) manually resulted (Completed)    ECG 12 Lead (Completed)    Migraine with aura and without status migrainosus, not intractable     Stable, continue to follow with neurology per protocol          Other Visit Diagnoses       Encounter for screening for HIV        Relevant Orders    HIV 1/2 Antigen/Antibody Screen with Reflex to Confirmation    Need for hepatitis C screening test        Relevant Orders    Hepatitis C Antibody    Tuberculosis screening        Relevant Orders    T-Spot TB

## 2024-06-11 NOTE — ASSESSMENT & PLAN NOTE
Complete history and physical examination was performed  EKG reveals sinus rhythm without acute changes  We will notify of test results once available    It was such a pleasure getting to know you since you have been a preteen, and I wish you the best of luck with your transition and move to [place]!  If there is anything that I can ever do for you going forward, do not hesitate to reach out

## 2024-06-12 NOTE — RESULT ENCOUNTER NOTE
I am still waiting for your tuberculosis screening test, hepatitis C, and HIV, but please know the rest of your blood work looks fantastic including your thyroid, cholesterol, sugar, kidney, liver, and complete blood cell count

## 2024-06-13 LAB
NIL(NEG) CONTROL SPOT COUNT: NORMAL
PANEL A SPOT COUNT: NORMAL
PANEL B SPOT COUNT: NORMAL
POS CONTROL SPOT COUNT: NORMAL
T-SPOT. TB INTERPRETATION: NORMAL

## 2024-06-14 NOTE — RESULT ENCOUNTER NOTE
Camila, I know that you have seen this already, and I know you are moving today, so I am so sorry about this but something happened at the lab where they tell me the specimen that they received for your tuberculosis test was invalid so they are suggesting that it needs to be repeated.  When you make your way to [place], I can send you over another requisition to have this done at a local lab.  Just sent me a message, and I will take care of that requisition for you.

## 2024-06-17 ENCOUNTER — APPOINTMENT (OUTPATIENT)
Dept: NEUROLOGY | Facility: CLINIC | Age: 29
End: 2024-06-17
Payer: COMMERCIAL

## 2024-07-15 ENCOUNTER — SPECIALTY PHARMACY (OUTPATIENT)
Dept: PHARMACY | Facility: CLINIC | Age: 29
End: 2024-07-15

## 2025-01-09 DIAGNOSIS — M79.672 LEFT FOOT PAIN: Primary | ICD-10-CM

## 2025-01-10 ENCOUNTER — OFFICE VISIT (OUTPATIENT)
Dept: ORTHOPEDIC SURGERY | Facility: CLINIC | Age: 30
End: 2025-01-10
Payer: COMMERCIAL

## 2025-01-10 ENCOUNTER — HOSPITAL ENCOUNTER (OUTPATIENT)
Dept: RADIOLOGY | Facility: CLINIC | Age: 30
Discharge: HOME | End: 2025-01-10
Payer: COMMERCIAL

## 2025-01-10 VITALS — WEIGHT: 138 LBS | BODY MASS INDEX: 24.45 KG/M2 | HEIGHT: 63 IN

## 2025-01-10 DIAGNOSIS — M79.672 LEFT FOOT PAIN: ICD-10-CM

## 2025-01-10 DIAGNOSIS — S93.602A FOOT SPRAIN, LEFT, INITIAL ENCOUNTER: Primary | ICD-10-CM

## 2025-01-10 PROCEDURE — 1036F TOBACCO NON-USER: CPT

## 2025-01-10 PROCEDURE — 3008F BODY MASS INDEX DOCD: CPT

## 2025-01-10 PROCEDURE — 99213 OFFICE O/P EST LOW 20 MIN: CPT

## 2025-01-10 PROCEDURE — 99203 OFFICE O/P NEW LOW 30 MIN: CPT

## 2025-01-10 PROCEDURE — 73630 X-RAY EXAM OF FOOT: CPT | Mod: LT

## 2025-01-10 RX ORDER — AMITRIPTYLINE HYDROCHLORIDE 10 MG/1
TABLET, FILM COATED ORAL
COMMUNITY
Start: 2023-08-18

## 2025-01-11 NOTE — PROGRESS NOTES
Subjective    Patient ID: Camila Altamirano is a 29 y.o. female.    Chief Complaint: OTHER (LT FOOT PAIN FOR 5 DAYS/NKI)    HPI  This is a pleasant 29-year-old female presenting to the office for evaluation of left foot pain, which has been ongoing for approximately 5 days.  Patient states that she lives in [facility], but has returned home this weekend to visit with her and her 's family, who both reside in [place].  She is originally from [place].  She states that she was hiking Saturday in [facility], and there was no specific injury.  She was hiking on the rocks, but she states hike was not intense.  Since that day, she has been experiencing lateral and posterior plantar foot pain.  The pain is focal to her fifth metatarsal and heel, mostly pointing to the plantar aspect of her foot.  She states that symptoms have not improved, but they have not significantly worsened either.  She has not noticed any significant bruising swelling or redness.  It is somewhat difficult for her to fully weight-bear on left lower extremity, but she states it is tolerable.  She has been taking Advil, elevating and applying ice.  She will return home to [place] next week.  She works from home, mostly sedentary work.    The patient's past medical, surgical, family, and social history as well as allergies and medications were reviewed and updated in the chart.    Objective   Ortho Exam  Pleasant in no acute distress.  Walks in the office today with a mildly antalgic gait.  Left foot and ankle appearing without soft tissue swelling erythema or ecchymosis.  There is no warmth upon touch and skin is intact.  She is not tender upon palpation to lateral dorsal or plantar foot along palpation of fifth metatarsal.  There is no tenderness upon palpation of heel or plantar fascia.  No other significant areas of tenderness throughout left foot or ankle.  She has full range of motion of left foot or ankle.  She  has adequate strength with resisted dorsiflexion and plantarflexion.  Ankle joint is stable, no ligament laxity.  Sensation is intact to light touch.    Image Results:  Multiple view x-rays of the left foot obtained today personally reviewed, without evidence of acute fracture or dislocation.  No bony abnormalities were noted.    Assessment/Plan   Encounter Diagnoses:  Foot sprain, left, initial encounter    Left foot pain    Plan: Discussion with patient in regards to left plantar and lateral foot pain with review of today's x-rays.  Explained that there is no bony abnormality noted on today's x-rays.  I did talk to patient about the possibility that there could be a small stress fracture, but I would think that she would have much more significant difficulty weightbearing on left lower extremity.  I did offer patient a walking boot today in the office.  After speaking with the patient, we have decided to allow her to monitor symptoms over the next week.  If she continues to not see any improvement in symptoms or worsening symptoms, she was advised to obtain a walking boot to wear at all times with ambulation.  I did show patient a walking boot to obtain on [employer] which is exactly like the one we would provide today in the office.  If she is having severe significant difficulty and worsening symptoms.  Did advise follow-up with a foot and ankle specialist or podiatrist in [place].  She will avoid any aggravating activities such as prolonged walking or standing, running or hiking over the next week.  She should be wearing well supportive tennis shoes.  She can take Advil and continue to apply ice and elevate.  Patient will return to [place] next week, but I did advise her to please reach out to me with any questions or concerns for continued care in [place].